# Patient Record
Sex: MALE | Race: WHITE
[De-identification: names, ages, dates, MRNs, and addresses within clinical notes are randomized per-mention and may not be internally consistent; named-entity substitution may affect disease eponyms.]

---

## 2020-05-24 ENCOUNTER — HOSPITAL ENCOUNTER (EMERGENCY)
Dept: HOSPITAL 75 - ER | Age: 40
Discharge: HOME | End: 2020-05-24
Payer: MEDICAID

## 2020-05-24 VITALS — BODY MASS INDEX: 28.06 KG/M2 | WEIGHT: 200.4 LBS | HEIGHT: 70.98 IN

## 2020-05-24 VITALS — DIASTOLIC BLOOD PRESSURE: 37 MMHG | SYSTOLIC BLOOD PRESSURE: 114 MMHG

## 2020-05-24 DIAGNOSIS — R53.81: ICD-10-CM

## 2020-05-24 DIAGNOSIS — M54.5: Primary | ICD-10-CM

## 2020-05-24 DIAGNOSIS — F41.9: ICD-10-CM

## 2020-05-24 DIAGNOSIS — R53.83: ICD-10-CM

## 2020-05-24 DIAGNOSIS — F17.210: ICD-10-CM

## 2020-05-24 LAB
ALBUMIN SERPL-MCNC: 4.2 GM/DL (ref 3.2–4.5)
ALP SERPL-CCNC: 61 U/L (ref 40–136)
ALT SERPL-CCNC: 24 U/L (ref 0–55)
APTT PPP: YELLOW S
BACTERIA #/AREA URNS HPF: NEGATIVE /HPF
BARBITURATES UR QL: NEGATIVE
BASOPHILS # BLD AUTO: 0 10^3/UL (ref 0–0.1)
BASOPHILS NFR BLD AUTO: 0 % (ref 0–10)
BENZODIAZ UR QL SCN: POSITIVE
BILIRUB SERPL-MCNC: 0.6 MG/DL (ref 0.1–1)
BILIRUB UR QL STRIP: NEGATIVE
BUN/CREAT SERPL: 12
CALCIUM SERPL-MCNC: 9.2 MG/DL (ref 8.5–10.1)
CHLORIDE SERPL-SCNC: 110 MMOL/L (ref 98–107)
CO2 SERPL-SCNC: 19 MMOL/L (ref 21–32)
COCAINE UR QL: NEGATIVE
CREAT SERPL-MCNC: 0.82 MG/DL (ref 0.6–1.3)
EOSINOPHIL # BLD AUTO: 0.1 10^3/UL (ref 0–0.3)
EOSINOPHIL NFR BLD AUTO: 0 % (ref 0–10)
ERYTHROCYTE [DISTWIDTH] IN BLOOD BY AUTOMATED COUNT: 13.1 % (ref 10–14.5)
FIBRINOGEN PPP-MCNC: CLEAR MG/DL
GFR SERPLBLD BASED ON 1.73 SQ M-ARVRAT: > 60 ML/MIN
GLUCOSE SERPL-MCNC: 108 MG/DL (ref 70–105)
GLUCOSE UR STRIP-MCNC: NEGATIVE MG/DL
HCT VFR BLD CALC: 46 % (ref 40–54)
HGB BLD-MCNC: 15.8 G/DL (ref 13.3–17.7)
KETONES UR QL STRIP: NEGATIVE
LEUKOCYTE ESTERASE UR QL STRIP: NEGATIVE
LYMPHOCYTES # BLD AUTO: 3 X 10^3 (ref 1–4)
LYMPHOCYTES NFR BLD AUTO: 25 % (ref 12–44)
MAGNESIUM SERPL-MCNC: 2.2 MG/DL (ref 1.6–2.4)
MANUAL DIFFERENTIAL PERFORMED BLD QL: NO
MCH RBC QN AUTO: 32 PG (ref 25–34)
MCHC RBC AUTO-ENTMCNC: 35 G/DL (ref 32–36)
MCV RBC AUTO: 93 FL (ref 80–99)
METHADONE UR QL SCN: NEGATIVE
METHAMPHETAMINE SCREEN URINE S: NEGATIVE
MONOCYTES # BLD AUTO: 0.9 X 10^3 (ref 0–1)
MONOCYTES NFR BLD AUTO: 8 % (ref 0–12)
NEUTROPHILS # BLD AUTO: 8 X 10^3 (ref 1.8–7.8)
NEUTROPHILS NFR BLD AUTO: 67 % (ref 42–75)
NITRITE UR QL STRIP: NEGATIVE
OPIATES UR QL SCN: POSITIVE
OXYCODONE UR QL: NEGATIVE
PH UR STRIP: 6 [PH] (ref 5–9)
PLATELET # BLD: 349 10^3/UL (ref 130–400)
PMV BLD AUTO: 10.1 FL (ref 7.4–10.4)
POTASSIUM SERPL-SCNC: 4 MMOL/L (ref 3.6–5)
PROPOXYPH UR QL: NEGATIVE
PROT SERPL-MCNC: 6.6 GM/DL (ref 6.4–8.2)
PROT UR QL STRIP: NEGATIVE
RBC #/AREA URNS HPF: (no result) /HPF
SODIUM SERPL-SCNC: 140 MMOL/L (ref 135–145)
SP GR UR STRIP: 1.01 (ref 1.02–1.02)
TRICYCLICS UR QL SCN: NEGATIVE
TSH SERPL DL<=0.05 MIU/L-ACNC: 0.83 UIU/ML (ref 0.35–4.94)
WBC # BLD AUTO: 12.1 10^3/UL (ref 4.3–11)
WBC #/AREA URNS HPF: (no result) /HPF

## 2020-05-24 PROCEDURE — 36415 COLL VENOUS BLD VENIPUNCTURE: CPT

## 2020-05-24 PROCEDURE — 80306 DRUG TEST PRSMV INSTRMNT: CPT

## 2020-05-24 PROCEDURE — 85025 COMPLETE CBC W/AUTO DIFF WBC: CPT

## 2020-05-24 PROCEDURE — 83735 ASSAY OF MAGNESIUM: CPT

## 2020-05-24 PROCEDURE — 81000 URINALYSIS NONAUTO W/SCOPE: CPT

## 2020-05-24 PROCEDURE — 80053 COMPREHEN METABOLIC PANEL: CPT

## 2020-05-24 PROCEDURE — 84443 ASSAY THYROID STIM HORMONE: CPT

## 2020-05-24 NOTE — ED BACK PAIN
General


Chief Complaint:  Back Problems


Stated Complaint:  WEAK / TIRED


Nursing Triage Note:  


AMB TO ED WITH L BACK FLANK PAIN ONSET TODAY HAS BEEN TIRED  APPEARS ANXIOUS ON 


ADMIT.


Nursing Sepsis Screen:  No Definite Risk


Source of Information:  Patient (VERY  VAGUE AND DIFFICULT HISTORIAN)





History of Present Illness


Date Seen by Provider:  May 24, 2020


Time Seen by Provider:  09:20


Initial Comments


PT ARRIVES VIA POV FROM HOME


PT SATES HE WOKE UP THIS MORNING AND HAD PAIN IN THE LOWER PART OF HIS BACK


HAS NOT TAKEN ANYTHING FOR PAIN AND PAIN IS NOT BAD NOW


PT HAS HISTORY OF CHRONIC BACK PAIN 





STATES ON FRIDAY NIGHT "I FELT REALLY HEAVY AND I COULDN'T STAY AWAKE AND I FEEL

REALLY TIRED THE LAST 2 DAYS" 


STATES HE HAS BEEN SLEEPING ALOT THE LAST 2 DAYS AND THEN WHEN HE GOT UP THIS 

MORNING HIS LOWER BACK HURT


STATES ON FRIDAY "A SENSATION CAME OVER MED AND I GOT REAL ANXIOUS" "AND MY 

HEART WAS PUMPIN' AND PUMPIN' AND PUMPIN'" 


STATES THEN HE WAS REALLY TIRED





NO FEVER/SWEATS/CHILLS


STATES THIS MORNING HE COUGHED REALLY HARD AND THREW UP ONCE--OTHERWISE HAS NOT 

BEEN COUGHING


NO NAUSEA


HAD DIARRHEA X 1 THIS AM


NO ABDOMINAL PAIN


HAS BEEN DRINKING WATER THIS MORNING


STATES HE HAS BEEN URINATING ALOT TODAY, BUT NO PAIN ON URINATION





NO KNOWN SICK CONTACTS OR EXPOSURE TO CORONAVIRUS


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


Other Comments





PCP: DR. LESTER AT [facility]





Allergies and Home Medications


Allergies


Coded Allergies:  


     No Known Drug Allergies (Unverified , 4/21/11)





Home Medications


Fluoxetine Hcl 20 Mg Capsule, 1 EACH PO DAILY, (Reported)


Hydrocodone/Acetaminophen 1 Each Tablet, 2 EACH PO Q6 PRN, (Reported)


Lorazepam 0.5 Mg Tablet, 0.5 MG PO TI PRN, (Reported)





Patient Home Medication List


Home Medication List Reviewed:  Yes





Review of Systems


Constitutional:  see HPI; No chills, No diaphoresis, No dizziness, No fever; 

malaise


EENTM:  no symptoms reported; No nose congestion, No throat pain


Respiratory:  see HPI; No orthopnea, No phlegm, No short of breath, No wheezing


Cardiovascular:  see HPI; No chest pain, No edema, No syncope


Gastrointestinal:  see HPI; No abdominal pain; diarrhea; No nausea


Genitourinary:  see HPI, frequency


Musculoskeletal:  see HPI, back pain


Skin:  no symptoms reported


Psychiatric/Neurological:  No Symptoms Reported; Denies Headache, Denies 

Numbness, Denies Paresthesia, Denies Tingling, Denies Weakness





Past Medical-Social-Family Hx


Patient Social History


Alcohol Use:  Past History (NONE FOR 10 YEARS)


Recreational Drug Use:  No


Smoking Status:  Current Everyday Smoker (1 PPD)


Type Used:  Cigarettes


Recent Foreign Travel:  No


Contact w/Someone Who Travel:  No


Recent Infectious Disease Expo:  No





Immunizations Up To Date


Tetanus Booster (TDap):  Less than 5yrs





Seasonal Allergies


Seasonal Allergies:  No





Past Medical History


Surgeries:  No


Respiratory:  No (HAS BEEN PRESCRIBED AN INHALER FOR AN EPISODE OF BRONCHITIS)


Cardiac:  No


Neurological:  No


Reproductive Disorders:  No


Gastrointestinal:  No


Musculoskeletal:  Yes


Chronic Back Pain


Endocrine:  No


HEENT:  No


Cancer:  No


Psychosocial:  No


Integumentary:  No


Blood Disorders:  No





Family Medical History


No Pertinent Family Hx





Physical Exam


Vital Signs





Vital Signs - First Documented








 5/24/20





 09:19


 


Temp 36.7


 


Pulse 88


 


Resp 18


 


B/P (MAP) 146/99 (115)


 


Pulse Ox 98


 


O2 Delivery Room Air





Capillary Refill : Less Than 3 Seconds


Height, Weight, BMI


Height: 6'0"


Weight: 170lbs. oz. [id number]; 27.00 BMI


Method:Stated


General Appearance:  No Apparent Distress, WD/WN


Neck:  Full Range of Motion, Normal Inspection, Non Tender, Supple


Cardiovascular:  Regular Rate, Rhythm, No Edema, No Murmur, Normal Peripheral 

Pulses


Respiratory:  Normal Breath Sounds, No Accessory Muscle Use, No Respiratory 

Distress


Gastrointestinal:  Normal Bowel Sounds, No Organomegaly, No Pulsatile Mass, Non 

Tender, Soft


Back:  No CVA Tenderness


Extremity:  Normal Capillary Refill, Normal Inspection, Normal Range of Motion, 

Non Tender, No Calf Tenderness, No Pedal Edema


Neurologic/Psychiatric:  Alert, Oriented x3, No Motor/Sensory Deficits, CNs II-

XII Norm as Tested


Skin:  Normal Color, Warm/Dry; No Rash





Progress/Results/Core Measures


Results/Orders


Lab Results





Laboratory Tests








Test


 5/24/20


09:32 5/24/20


10:13 Range/Units


 


 


Urine Color YELLOW    


 


Urine Clarity CLEAR    


 


Urine pH 6.0   5-9  


 


Urine Specific Gravity 1.015 L  1.016-1.022  


 


Urine Protein NEGATIVE   NEGATIVE  


 


Urine Glucose (UA) NEGATIVE   NEGATIVE  


 


Urine Ketones NEGATIVE   NEGATIVE  


 


Urine Nitrite NEGATIVE   NEGATIVE  


 


Urine Bilirubin NEGATIVE   NEGATIVE  


 


Urine Urobilinogen 0.2   < = 1.0  MG/DL


 


Urine Leukocyte Esterase NEGATIVE   NEGATIVE  


 


Urine RBC (Auto) NEGATIVE   NEGATIVE  


 


Urine RBC NONE    /HPF


 


Urine WBC NONE    /HPF


 


Urine Crystals NONE    /LPF


 


Urine Bacteria NEGATIVE    /HPF


 


Urine Casts NONE    /LPF


 


Urine Mucus NEGATIVE    /LPF


 


Urine Culture Indicated NO    


 


Urine Opiates Screen POSITIVE H  NEGATIVE  


 


Urine Oxycodone Screen NEGATIVE   NEGATIVE  


 


Urine Methadone Screen NEGATIVE   NEGATIVE  


 


Urine Propoxyphene Screen NEGATIVE   NEGATIVE  


 


Urine Barbiturates Screen NEGATIVE   NEGATIVE  


 


Ur Tricyclic Antidepressants


Screen NEGATIVE 


 


 NEGATIVE  





 


Urine Phencyclidine Screen NEGATIVE   NEGATIVE  


 


Urine Amphetamines Screen NEGATIVE   NEGATIVE  


 


Urine Methamphetamines Screen NEGATIVE   NEGATIVE  


 


Urine Benzodiazepines Screen POSITIVE H  NEGATIVE  


 


Urine Cocaine Screen NEGATIVE   NEGATIVE  


 


Urine Cannabinoids Screen NEGATIVE   NEGATIVE  


 


White Blood Count


 


 12.1 H


 4.3-11.0


10^3/uL


 


Red Blood Count


 


 4.90 


 4.35-5.85


10^6/uL


 


Hemoglobin  15.8  13.3-17.7  G/DL


 


Hematocrit  46  40-54  %


 


Mean Corpuscular Volume  93  80-99  FL


 


Mean Corpuscular Hemoglobin  32  25-34  PG


 


Mean Corpuscular Hemoglobin


Concent 


 35 


 32-36  G/DL





 


Red Cell Distribution Width  13.1  10.0-14.5  %


 


Platelet Count


 


 349 


 130-400


10^3/uL


 


Mean Platelet Volume  10.1  7.4-10.4  FL


 


Neutrophils (%) (Auto)  67  42-75  %


 


Lymphocytes (%) (Auto)  25  12-44  %


 


Monocytes (%) (Auto)  8  0-12  %


 


Eosinophils (%) (Auto)  0  0-10  %


 


Basophils (%) (Auto)  0  0-10  %


 


Neutrophils # (Auto)  8.0 H 1.8-7.8  X 10^3


 


Lymphocytes # (Auto)  3.0  1.0-4.0  X 10^3


 


Monocytes # (Auto)  0.9  0.0-1.0  X 10^3


 


Eosinophils # (Auto)


 


 0.1 


 0.0-0.3


10^3/uL


 


Basophils # (Auto)


 


 0.0 


 0.0-0.1


10^3/uL


 


Sodium Level  140  135-145  MMOL/L


 


Potassium Level  4.0  3.6-5.0  MMOL/L


 


Chloride Level  110 H   MMOL/L


 


Carbon Dioxide Level  19 L 21-32  MMOL/L


 


Anion Gap  11  5-14  MMOL/L


 


Blood Urea Nitrogen  10  7-18  MG/DL


 


Creatinine


 


 0.82 


 0.60-1.30


MG/DL


 


Estimat Glomerular Filtration


Rate 


 > 60 


  





 


BUN/Creatinine Ratio  12   


 


Glucose Level  108 H   MG/DL


 


Calcium Level  9.2  8.5-10.1  MG/DL


 


Corrected Calcium  9.0  8.5-10.1  MG/DL


 


Magnesium Level  2.2  1.6-2.4  MG/DL


 


Total Bilirubin  0.6  0.1-1.0  MG/DL


 


Aspartate Amino Transf


(AST/SGOT) 


 19 


 5-34  U/L





 


Alanine Aminotransferase


(ALT/SGPT) 


 24 


 0-55  U/L





 


Alkaline Phosphatase  61    U/L


 


Total Protein  6.6  6.4-8.2  GM/DL


 


Albumin  4.2  3.2-4.5  GM/DL








My Orders





Orders - ALISA CABRERA DO


Drug Screen Stat (Urine) (5/24/20 09:29)


Ua Culture If Indicated (5/24/20 09:29)


Cbc With Automated Diff (5/24/20 10:08)


Comprehensive Metabolic Panel (5/24/20 10:08)


Magnesium (5/24/20 10:08)


Thyroid Analyzer (5/24/20 10:08)





Vital Signs/I&O











 5/24/20





 09:19


 


Temp 36.7


 


Pulse 88


 


Resp 18


 


B/P (MAP) 146/99 (115)


 


Pulse Ox 98


 


O2 Delivery Room Air














Blood Pressure Mean:                    115











Progress


Progress Note :  


Progress Note


UDS + FOR OPIATES AND BENZODIAZEPINES--PT CLAIMS HE ONLY TAKES VITAMINS AND JUST

STARTED TAKING THEM THE LAST COUPLE OF DAYS BECAUSE HE HAS BEEN TIRED





Departure


Impression





   Primary Impression:  


   Low back pain


   Additional Impressions:  


   Malaise and fatigue


   Anxiety


Disposition:  01 HOME, SELF-CARE


Condition:  Stable





Departure-Patient Inst.


Referrals:  


[facility] (PCP/Family)


Primary Care Physician


Patient Instructions:  Low Back Pain  (DC), Anxiety, Adult (DC), Dealing with 

Fatigue from the Drugs You Take





Add. Discharge Instructions:  


MOIST HEAT TO BACK AT 20 MINUTE INTERVALS





NO LIFTING OVER 5 LBS, NO TWISTING OR BENDING AT THE WAIST





FOLLOW UP WITH YOUR DR IN 3-4 DAYS IF NO BETTER





All discharge instructions reviewed with patient and/or family. Voiced 

understanding.


Scripts


Meloxicam (Mobic) 15 Mg Tablet


15 MG PO DAILY, #10 TAB


   Prov: ALISA CABRERA DO         5/24/20











ALISA CABRERA DO                 May 24, 2020 09:38